# Patient Record
Sex: MALE
[De-identification: names, ages, dates, MRNs, and addresses within clinical notes are randomized per-mention and may not be internally consistent; named-entity substitution may affect disease eponyms.]

---

## 2021-02-21 ENCOUNTER — NURSE TRIAGE (OUTPATIENT)
Dept: OTHER | Facility: CLINIC | Age: 51
End: 2021-02-21

## 2021-02-21 NOTE — TELEPHONE ENCOUNTER
Brief description of triage: ear feels full    Triage indicates for patient to follow home care advice    Care advice provided, patient verbalizes understanding; denies any other questions or concerns; instructed to call back for any new or worsening symptoms. This triage is a result of a call to [address] [place] [address]. Please do not respond to the triage nurse through this encounter. Any subsequent communication should be directly with the patient. Reason for Disposition   Ear congestion    Answer Assessment - Initial Assessment Questions  1. LOCATION: \"Which ear is involved? \"        Right ear    2. SENSATION: \"Describe how the ear feels. \"       Full - like the ear needs to pop, when clinches jaw /teeth hears an echo    3. ONSET:  \"When did the ear symptoms start? \"        Yesterday    4. PAIN: \"Do you also have an earache? \" If so, ask: \"How bad is it? \" (Scale 1-10; or mild, moderate, severe)      No pain    5. CAUSE: \"What do you think is causing the ear congestion? \"      Has had a very small amount of brown crust coming from the ear - \"like a scab\" - has noted off and on for a few weeks at times      6. URI: \"Do you have a runny nose or cough? \"       None    7. NASAL ALLERGIES: \"Are there symptoms of hay fever, such as sneezing or a clear nasal discharge? \"      None    8. PREGNANCY: \"Is there any chance you are pregnant? \" \"When was your last menstrual period? \"      NA    Protocols used: EAR - CONGESTION-ADULT-